# Patient Record
Sex: FEMALE | Race: WHITE | NOT HISPANIC OR LATINO | Employment: UNEMPLOYED | URBAN - METROPOLITAN AREA
[De-identification: names, ages, dates, MRNs, and addresses within clinical notes are randomized per-mention and may not be internally consistent; named-entity substitution may affect disease eponyms.]

---
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
## 2024-01-01 NOTE — TELEPHONE ENCOUNTER
Caller states she was seen in ED last night and prescribed mag citrate for constipation. Caller state she has a history of kidney disease and read on the medication insert that she should not take medication with certain kidney issues. Caller states that insert states she should speak with PCP, but she doesn't have one. Caller advised to contact local pharmacist to discuss medication safety and effects.     Reason for Disposition   [1] Caller has medicine question about med NOT prescribed by PCP AND [2] triager unable to answer question (e.g., compatibility with other med, storage)    Additional Information   Negative: [1] Intentional drug overdose AND [2] suicidal thoughts or ideas   Negative: MORE THAN A DOUBLE DOSE of a prescription or over-the-counter (OTC) drug   Negative: [1] DOUBLE DOSE (an extra dose or lesser amount) of prescription drug AND [2] any symptoms (e.g., dizziness, nausea, pain, sleepiness)   Negative: [1] DOUBLE DOSE (an extra dose or lesser amount) of over-the-counter (OTC) drug AND [2] any symptoms (e.g., dizziness, nausea, pain, sleepiness)   Negative: Took another person's prescription drug   Negative: [1] DOUBLE DOSE (an extra dose or lesser amount) of prescription drug AND [2] NO symptoms  (Exception: A double dose of antibiotics.)   Negative: Diabetes drug error or overdose (e.g., took wrong type of insulin or took extra dose)   Negative: [1] Prescription not at pharmacy AND [2] was prescribed by PCP recently (Exception: Triager has access to EMR and prescription is recorded there. Go to Home Care and confirm for pharmacy.)   Negative: [1] Pharmacy calling with prescription question AND [2] triager unable to answer question   Negative: [1] Caller has URGENT medicine question about med that PCP or specialist prescribed AND [2] triager unable to answer question   Negative: Medicine patch causing local rash or itching    Protocols used: Medication Question Call-A-

## 2024-01-08 ENCOUNTER — HOSPITAL ENCOUNTER (EMERGENCY)
Facility: HOSPITAL | Age: 19
Discharge: HOME | End: 2024-01-08
Attending: EMERGENCY MEDICINE
Payer: MEDICAID

## 2024-01-08 VITALS
TEMPERATURE: 98 F | SYSTOLIC BLOOD PRESSURE: 104 MMHG | HEIGHT: 63 IN | OXYGEN SATURATION: 97 % | HEART RATE: 104 BPM | RESPIRATION RATE: 18 BRPM | DIASTOLIC BLOOD PRESSURE: 76 MMHG | BODY MASS INDEX: 26.58 KG/M2 | WEIGHT: 150 LBS

## 2024-01-08 DIAGNOSIS — J06.9 VIRAL URI WITH COUGH: Primary | ICD-10-CM

## 2024-01-08 LAB
GROUP A STREP, MOLECULAR: NEGATIVE
INFLUENZA A, MOLECULAR: NEGATIVE
INFLUENZA B, MOLECULAR: NEGATIVE
SARS-COV-2 RDRP RESP QL NAA+PROBE: NEGATIVE
SPECIMEN SOURCE: NORMAL

## 2024-01-08 PROCEDURE — 96372 THER/PROPH/DIAG INJ SC/IM: CPT | Performed by: NURSE PRACTITIONER

## 2024-01-08 PROCEDURE — 99284 EMERGENCY DEPT VISIT MOD MDM: CPT

## 2024-01-08 PROCEDURE — 87651 STREP A DNA AMP PROBE: CPT | Performed by: NURSE PRACTITIONER

## 2024-01-08 PROCEDURE — 63600175 PHARM REV CODE 636 W HCPCS: Performed by: NURSE PRACTITIONER

## 2024-01-08 PROCEDURE — 87502 INFLUENZA DNA AMP PROBE: CPT | Performed by: NURSE PRACTITIONER

## 2024-01-08 PROCEDURE — U0002 COVID-19 LAB TEST NON-CDC: HCPCS | Performed by: NURSE PRACTITIONER

## 2024-01-08 RX ORDER — DEXAMETHASONE SODIUM PHOSPHATE 10 MG/ML
10 INJECTION INTRAMUSCULAR; INTRAVENOUS
Status: COMPLETED | OUTPATIENT
Start: 2024-01-08 | End: 2024-01-08

## 2024-01-08 RX ORDER — BROMPHENIRAMINE MALEATE, PSEUDOEPHEDRINE HYDROCHLORIDE, AND DEXTROMETHORPHAN HYDROBROMIDE 2; 30; 10 MG/5ML; MG/5ML; MG/5ML
5 SYRUP ORAL EVERY 4 HOURS PRN
Qty: 100 ML | Refills: 0 | Status: SHIPPED | OUTPATIENT
Start: 2024-01-08

## 2024-01-08 RX ADMIN — DEXAMETHASONE SODIUM PHOSPHATE 10 MG: 10 INJECTION INTRAMUSCULAR; INTRAVENOUS at 11:01

## 2024-01-08 NOTE — ED PROVIDER NOTES
CHIEF COMPLAINT  Chief Complaint   Patient presents with    Cough     Congestion, sore throat, runny nose for 4 days. Taking Nyquil, Dayquil, mucinex, flonase. No known sick contacts. +nausea. No vomiting, diarrhea.       HISTORY OF PRESENT ILLNESS  Ashley Howard is a 18 y.o. female presenting with URI symptoms for the past 4 days. Patient did not try OTC medications.  She c/o non stop runny nose, sore throat, no relief for the past 4 days. No other specific aggravating or relieving factors otherwise.      PAST MEDICAL HISTORY  Past Medical History:   Diagnosis Date    Migraine headache     PCOS (polycystic ovarian syndrome) 2022    Renal disorder     Atrophied Right kidney       CURRENT MEDICATIONS    No current facility-administered medications for this encounter.    Current Outpatient Medications:     acetaminophen (TYLENOL) 325 MG tablet, Take 2 tablets (650 mg total) by mouth every 4 (four) hours as needed (Do not use more than 4,000 mg total daily.)., Disp: , Rfl: 0    acetaminophen (TYLENOL) 500 MG tablet, Take 1 tablet (500 mg total) by mouth every 6 (six) hours as needed for Temperature greater than or Pain., Disp: 20 tablet, Rfl: 0    albuterol (PROVENTIL/VENTOLIN HFA) 90 mcg/actuation inhaler, Inhale 2 puffs into the lungs every 6 (six) hours as needed., Disp: , Rfl:     ALBUTEROL INHL, Inhale into the lungs daily as needed., Disp: , Rfl:     baclofen (LIORESAL) 10 MG tablet, Take 10 mg by mouth daily as needed., Disp: , Rfl:     benzocaine-menthoL 6-10 mg lozenge, Take 1 lozenge by mouth every 2 (two) hours as needed for Pain (sore throat)., Disp: 18 tablet, Rfl: 0    brompheniramine-pseudoeph-DM (BROMFED DM) 2-30-10 mg/5 mL Syrp, Take 5 mLs by mouth every 4 (four) hours as needed (cough, congestion)., Disp: 100 mL, Rfl: 0    busPIRone (BUSPAR) 15 MG tablet, Take 15 mg by mouth 2 (two) times daily., Disp: , Rfl:     carboxymethylcellulose (LUBRICANT EYE DROPS) 0.5 % Dpet, Place 1 drop into both  eyes 3 (three) times daily as needed (Eye irritation)., Disp: 30 each, Rfl: 0    cetirizine (ZYRTEC) 10 MG tablet, Take 1 tablet (10 mg total) by mouth daily as needed for Allergies or Rhinitis., Disp: 30 tablet, Rfl: 0    cyclobenzaprine (FLEXERIL) 5 MG tablet, Take 5 mg by mouth nightly as needed., Disp: , Rfl:     DENTA 5000 PLUS 1.1 % Crea, BRUSH ON TOOTH 1 TIME PER DAY, Disp: , Rfl: 3    diphenhydrAMINE (BENADRYL) 25 mg capsule, Take 1 capsule (25 mg total) by mouth every 6 (six) hours as needed for Itching or Allergies., Disp: 20 capsule, Rfl: 0    docusate sodium (COLACE) 100 MG capsule, Take 2 capsules (200 mg total) by mouth every evening., Disp: 60 capsule, Rfl: 0    ergocalciferol, vitamin D2, (VITAMIN D ORAL), Take by mouth once daily., Disp: , Rfl:     EScitalopram oxalate (LEXAPRO) 10 MG tablet, Take 15 mg by mouth once daily. , Disp: , Rfl:     ESTARYLLA 0.25-35 mg-mcg per tablet, Take 1 tablet by mouth., Disp: , Rfl:     fluticasone propionate (FLONASE) 50 mcg/actuation nasal spray, 1 spray (50 mcg total) by Each Nostril route 2 (two) times daily as needed for Rhinitis or Allergies., Disp: 15 g, Rfl: 0    gabapentin (NEURONTIN) 300 MG capsule, Take 300 mg by mouth 3 (three) times daily., Disp: , Rfl:     hydrocortisone 1 % cream, Apply to affected area 2 times daily, Disp: 30 g, Rfl: 1    ibuprofen (ADVIL,MOTRIN) 600 MG tablet, Take 1 tablet (600 mg total) by mouth every 6 (six) hours as needed for Pain., Disp: 20 tablet, Rfl: 0    ibuprofen (ADVIL,MOTRIN) 800 MG tablet, Take 1 tablet (800 mg total) by mouth 3 (three) times daily., Disp: 20 tablet, Rfl: 0    levocetirizine (XYZAL) 5 MG tablet, Take 5 mg by mouth once daily., Disp: , Rfl:     nitrofurantoin (MACRODANTIN) 50 MG capsule, SMARTSI Capsule(s) By Mouth, Disp: , Rfl:     norethindrone-ethinyl estradiol (MICROGESTIN ) 1-20 mg-mcg per tablet, , Disp: , Rfl:     ondansetron (ZOFRAN-ODT) 4 MG TbDL, Take 1 tablet (4 mg total) by mouth  every 6 (six) hours as needed (Nausea)., Disp: 12 tablet, Rfl: 0    pantoprazole (PROTONIX) 20 MG tablet, Take 2 tablets (40 mg total) by mouth once daily., Disp: 60 tablet, Rfl: 0    phenazopyridine (PYRIDIUM) 200 MG tablet, Take 1 tablet (200 mg total) by mouth 3 (three) times daily as needed for Pain (burning with urination)., Disp: 6 tablet, Rfl: 0    polyethylene glycol (GLYCOLAX) 17 gram PwPk, Take 17 g by mouth once daily. Please take 1 packet 3 times daily for 3 to 5 days, and then take 1 packet daily., Disp: 30 packet, Rfl: 1    promethazine (PHENERGAN) 25 MG suppository, Place 1 suppository (25 mg total) rectally every 6 (six) hours as needed for Nausea., Disp: 5 suppository, Rfl: 0    promethazine (PHENERGAN) 25 MG tablet, Take 1 tablet (25 mg total) by mouth every 6 (six) hours as needed for Nausea., Disp: 15 tablet, Rfl: 0    sumatriptan (IMITREX) 25 MG Tab, Take 25 mg by mouth., Disp: , Rfl:     venlafaxine (EFFEXOR) 100 MG Tab, Take 175 mg by mouth Daily., Disp: , Rfl:     ALLERGIES    Review of patient's allergies indicates:   Allergen Reactions    Doxycycline Nausea And Vomiting       SURGICAL HISTORY    Past Surgical History:   Procedure Laterality Date    COLONOSCOPY Left 6/7/2021    Procedure: COLONOSCOPY;  Surgeon: Tatianna Bowman MD;  Location: [address]);  Service: Endoscopy;  Laterality: Left;    DENTAL SURGERY      ESOPHAGOGASTRODUODENOSCOPY Left 6/7/2021    Procedure: ESOPHAGOGASTRODUODENOSCOPY (EGD);  Surgeon: Tatianna Bowman MD;  Location: [address]);  Service: Endoscopy;  Laterality: Left;  Covid pre-procedure screening scheduled 6/4    TYMPANOSTOMY TUBE PLACEMENT         SOCIAL HISTORY    Social History     Socioeconomic History    Marital status: Significant Other   Tobacco Use    Smoking status: Never     Passive exposure: Yes    Smokeless tobacco: Never   Substance and Sexual Activity    Alcohol use: Never    Drug use: Never    Sexual activity: Never   Social  "History Narrative    11th grade, 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    Lives at home with mom, mom's boyfriend, sister.       FAMILY HISTORY    History reviewed. No pertinent family history.    REVIEW OF SYSTEMS    Review of Systems   HENT:  Positive for congestion and sore throat.    Respiratory:  Positive for cough.      All other systems reviewed and are negative    VITAL SIGNS:   /76 (BP Location: Left arm, Patient Position: Sitting)   Pulse 104   Temp 98.1 °F (36.7 °C) (Oral)   Resp 18   Ht 5' 3" (1.6 m)   Wt 68 kg (150 lb)   SpO2 97%   BMI 26.57 kg/m²      Physical Exam  Constitutional:       Appearance: Normal appearance.   HENT:      Head: Normocephalic.      Right Ear: Tympanic membrane, ear canal and external ear normal.      Left Ear: Tympanic membrane, ear canal and external ear normal.      Nose: Congestion and rhinorrhea present.      Mouth/Throat:      Mouth: Mucous membranes are moist.      Pharynx: Posterior oropharyngeal erythema present. No oropharyngeal exudate.   Eyes:      Pupils: Pupils are equal, round, and reactive to light.   Cardiovascular:      Rate and Rhythm: Normal rate.   Pulmonary:      Effort: Pulmonary effort is normal. No respiratory distress.      Breath sounds: Normal breath sounds.   Abdominal:      Palpations: Abdomen is soft.   Musculoskeletal:         General: Normal range of motion.   Skin:     General: Skin is warm.      Capillary Refill: Capillary refill takes less than 2 seconds.   Neurological:      General: No focal deficit present.      Mental Status: She is alert.      GCS: GCS eye subscore is 4. GCS verbal subscore is 5. GCS motor subscore is 6.   Psychiatric:         Attention and Perception: Attention normal.         Mood and Affect: Mood normal.         Speech: Speech normal.       Vitals and nursing note reviewed.     LABS    Labs Reviewed   INFLUENZA A & B BY MOLECULAR   GROUP A STREP, MOLECULAR   SARS-COV-2 RNA AMPLIFICATION, QUAL    Narrative:     Is the patient " symptomatic?->Yes         EKG        RADIOLOGY    No orders to display         PROCEDURES    Procedures    Medications   dexAMETHasone sodium phos (PF) injection 10 mg (10 mg Intramuscular Given 1/8/24 3434)                Medical Decision Making  Ashley Howard is a 18 y.o. female presenting with URI symptoms for the past 4 days. Patient did not try OTC medications.  She c/o non stop runny nose, sore throat, no relief for the past 4 days. No other specific aggravating or relieving factors otherwise.    patient presenting to the ED for URI symptoms. Denies abdominal pain and emesis. Patient is non-toxic appearing and in no acute distress. Spectrum of symptoms most consistent with viral URI.  No respiratory distress or abnormal lung findings. Low suspicion for strep throat. No sinus TTP or purulent rhinorrhea to suggest acute bacterial rhinosinusitis at this time. No evidence of AOM, mastoiditis, PTA, Oskar's, epiglottitis, and meningitis.    Differential Diagnosis includes, but is not limited to:  Sepsis, meningitis, cavernous sinus thrombosis, nasal foreign body, otitis media/external, nasal polyp, bacterial sinusitis, allergic rhinitis, influenza, bacterial/viral pharyngitis, peritonsillar abscess, retropharyngeal abscess, bacterial/viral pneumonia.    Clinical impression:     At this time, I feel there is no emergent condition requiring further evaluation or admission. I believe the patient is stable for discharge from the ED. The patient and any additional family present were updated with test results, overall clinical impression, and recommended further plan of care. All questions were answered. patient/caregiver expressed understanding and agreed with current plan for discharge with PCP follow-up within 1 week. Strict return precautions were provided, including fever, N/V, headache, neck stiffness, return/worsening of current symptoms or any other concerns.    Problems Addressed:  Viral URI with cough:  acute illness or injury    Amount and/or Complexity of Data Reviewed  Labs: ordered. Decision-making details documented in ED Course.    Risk  Prescription drug management.           Discharge Medication List as of 1/8/2024 11:09 AM          Discharge Medication List as of 1/8/2024 11:09 AM        START taking these medications    Details   brompheniramine-pseudoeph-DM (BROMFED DM) 2-30-10 mg/5 mL Syrp Take 5 mLs by mouth every 4 (four) hours as needed (cough, congestion)., Starting Mon 1/8/2024, Normal             DISPOSITION  Patient discharged to home in stable condition.        FINAL IMPRESSION    1. Viral URI with cough         Thien Dickens, SANDRO  01/09/24 0003

## 2024-01-08 NOTE — ED NOTES
The patient complains of flu like symptoms for approximately 4 days with associated clear nasal drainage, clear sputum cough, nausea, and sore throat. Pt states she has taken multiple OTC symptom control medications with little relief. She states no known sick contacts and tested + for flu recently.      GENERAL: The patient weighs approximately 70kg. The patient is alert and interactive, well-nourished and non-toxic in appearance.    HEAD: Normocephalic and visually atraumatic. Pupils are 3mm round/reactive/brisk to light bilaterally with intact direct and consensual reflexes noted. No visible anisocoria is noted. Conjunctivas are pink/moist with no obvious petechiae. Oral mucosa is pink/moist. Posterior pharynx is pink with no obvious exudates. Tonsils absent. Uvula is midline. Phonation is normal    RESPIRATORY: Normal smooth respiratory effort with no obvious distress. No accessory muscle use is noted. There are no retractions noted. Bilateral breath sounds are clear    NEUROLOGIC: Alert/oriented/lucid with appropriate mental status. No slurred speech is noted. No facial droop is evident.    SKIN: Skin color is consistent with ethnicity. Warm/dry/pink.     Provider in triage assessing patient at this time. Orders being placed. Plan of Care to include continuous observation and reassurance, explain procedures to pt. Will maintain position of optimal comfort for patient. Awaiting further orders and disposition. Plan of care ongoing.

## 2024-03-17 ENCOUNTER — HOSPITAL ENCOUNTER (EMERGENCY)
Facility: HOSPITAL | Age: 19
Discharge: HOME | End: 2024-03-17
Attending: EMERGENCY MEDICINE
Payer: MEDICAID

## 2024-03-17 VITALS
DIASTOLIC BLOOD PRESSURE: 59 MMHG | TEMPERATURE: 99 F | HEART RATE: 82 BPM | SYSTOLIC BLOOD PRESSURE: 119 MMHG | RESPIRATION RATE: 18 BRPM | OXYGEN SATURATION: 100 % | HEIGHT: 63 IN | WEIGHT: 147 LBS | BODY MASS INDEX: 26.05 KG/M2

## 2024-03-17 DIAGNOSIS — S99.911A INJURY OF RIGHT ANKLE, INITIAL ENCOUNTER: ICD-10-CM

## 2024-03-17 DIAGNOSIS — S93.401A SPRAIN OF RIGHT ANKLE, UNSPECIFIED LIGAMENT, INITIAL ENCOUNTER: Primary | ICD-10-CM

## 2024-03-17 DIAGNOSIS — M25.571 RIGHT ANKLE PAIN: ICD-10-CM

## 2024-03-17 LAB
B-HCG UR QL: NEGATIVE
CTP QC/QA: YES

## 2024-03-17 PROCEDURE — 25000003 PHARM REV CODE 250: Performed by: NURSE PRACTITIONER

## 2024-03-17 PROCEDURE — 81025 URINE PREGNANCY TEST: CPT | Performed by: NURSE PRACTITIONER

## 2024-03-17 PROCEDURE — 99283 EMERGENCY DEPT VISIT LOW MDM: CPT | Mod: 25

## 2024-03-17 RX ORDER — NAPROXEN 500 MG/1
500 TABLET ORAL EVERY 12 HOURS PRN
Qty: 20 TABLET | Refills: 0 | Status: SHIPPED | OUTPATIENT
Start: 2024-03-17 | End: 2024-04-15 | Stop reason: ALTCHOICE

## 2024-03-17 RX ORDER — NAPROXEN 500 MG/1
500 TABLET ORAL
Status: COMPLETED | OUTPATIENT
Start: 2024-03-17 | End: 2024-03-17

## 2024-03-17 RX ADMIN — NAPROXEN 500 MG: 500 TABLET ORAL at 04:03

## 2024-03-17 NOTE — DISCHARGE INSTRUCTIONS

## 2024-03-17 NOTE — ED PROVIDER NOTES
Encounter Date: 3/17/2024       History     Chief Complaint   Patient presents with    Ankle Pain     Patient reports rolled right ankle the other day wants it checked out      18-year-old female with a history of fibromyalgia presenting for evaluation of right ankle pain after rolling her ankle while descending stairs 2 days ago.  Reports that pain is exacerbated with weight-bearing and ambulation.  She denies falling, head injury, knee pain, hip pain, or any other symptoms or concerns.  She has taken Tylenol for her pain but no medications or treatments attempted today.    The history is provided by the patient. No  was used.     Review of patient's allergies indicates:   Allergen Reactions    Doxycycline Nausea And Vomiting     Past Medical History:   Diagnosis Date    Migraine headache     PCOS (polycystic ovarian syndrome) 2022    Renal disorder     Atrophied Right kidney     Past Surgical History:   Procedure Laterality Date    COLONOSCOPY Left 6/7/2021    Procedure: COLONOSCOPY;  Surgeon: Tatianna Bowman MD;  Location: [address]);  Service: Endoscopy;  Laterality: Left;    DENTAL SURGERY      ESOPHAGOGASTRODUODENOSCOPY Left 6/7/2021    Procedure: ESOPHAGOGASTRODUODENOSCOPY (EGD);  Surgeon: Tatianna Bowman MD;  Location: [address]);  Service: Endoscopy;  Laterality: Left;  Covid pre-procedure screening scheduled 6/4    TYMPANOSTOMY TUBE PLACEMENT       History reviewed. No pertinent family history.  Social History     Tobacco Use    Smoking status: Never     Passive exposure: Yes    Smokeless tobacco: Never   Substance Use Topics    Alcohol use: Never    Drug use: Never     Review of Systems   Constitutional:  Negative for chills, fatigue and fever.   HENT:  Negative for congestion, ear pain, nosebleeds, postnasal drip, rhinorrhea, sinus pressure and sore throat.    Eyes:  Negative for photophobia and pain.   Respiratory:  Negative for apnea, cough, choking, chest  tightness, shortness of breath, wheezing and stridor.    Cardiovascular:  Negative for chest pain, palpitations and leg swelling.   Gastrointestinal:  Negative for abdominal pain, constipation, diarrhea, nausea and vomiting.   Genitourinary:  Negative for dysuria, flank pain, hematuria, pelvic pain and vaginal discharge.   Musculoskeletal:  Positive for arthralgias. Negative for back pain, gait problem and neck pain.   Skin:  Negative for color change, pallor, rash and wound.   Neurological:  Negative for dizziness, seizures, syncope, facial asymmetry, light-headedness and headaches.   Hematological:  Negative for adenopathy.   Psychiatric/Behavioral:  Negative for confusion. The patient is not nervous/anxious.        Physical Exam     Initial Vitals [03/17/24 1453]   BP Pulse Resp Temp SpO2   (!) 119/59 82 18 98.7 °F (37.1 °C) 100 %      MAP       --         Physical Exam    Nursing note and vitals reviewed.  Constitutional: She appears well-developed and well-nourished. She is not diaphoretic.   HENT:   Head: Normocephalic and atraumatic.   Right Ear: External ear normal.   Left Ear: External ear normal.   Nose: Nose normal.   Eyes: Conjunctivae and EOM are normal. Right eye exhibits no discharge. Left eye exhibits no discharge.   Neck: Neck supple. No tracheal deviation present.   Normal range of motion.  Cardiovascular:  Normal rate.           Pulmonary/Chest: No stridor. No respiratory distress.   Abdominal: Abdomen is soft. She exhibits no distension. There is no abdominal tenderness.   Musculoskeletal:         General: Tenderness present.      Cervical back: Normal range of motion and neck supple.      Comments: Tenderness to the right ankle overlying the lateral malleolus.  There is some associated bruising and swelling.  Ipsilateral DP pulse 2 +.  No pain or tenderness to the foot.  No tenderness overlying the Achilles.  No pain or tenderness to the ipsilateral knee.  No calf tenderness.     Neurological:  She is alert and oriented to person, place, and time. She has normal strength. No cranial nerve deficit or sensory deficit.   Skin: Skin is warm and dry.   Psychiatric: She has a normal mood and affect. Her behavior is normal. Judgment and thought content normal.         ED Course   Procedures  Labs Reviewed   POCT URINE PREGNANCY          Imaging Results              X-Ray Ankle Complete Right (Final result)  Result time 03/17/24 15:37:28      Final result by Derrick Montalvo MD (03/17/24 15:37:28)                   Impression:      1. No acute displaced fracture or dislocation of the ankle.      Electronically signed by: Derrick Montalvo MD  Date:    03/17/2024  Time:    15:37               Narrative:    EXAMINATION:  XR ANKLE COMPLETE 3 VIEW RIGHT    CLINICAL HISTORY:  Pain in right ankle and joints of right foot    TECHNIQUE:  AP, lateral, and oblique images of the right ankle were performed.    COMPARISON:  None    FINDINGS:  Three views right ankle.    No acute displaced fracture or dislocation of the ankle.  No radiopaque foreign body.  The ankle mortise is intact.                                       X-Ray Foot Complete Right (Final result)  Result time 03/17/24 15:38:30      Final result by Derrick Montalvo MD (03/17/24 15:38:30)                   Impression:      1. No acute displaced fracture or dislocation of the foot.      Electronically signed by: Derrick Montalvo MD  Date:    03/17/2024  Time:    15:38               Narrative:    EXAMINATION:  XR FOOT COMPLETE 3 VIEW RIGHT    CLINICAL HISTORY:  . Pain in right ankle and joints of right foot    TECHNIQUE:  AP, lateral, and oblique views of the right foot were performed.    COMPARISON:  None    FINDINGS:  Three views right foot.    No acute displaced fracture or dislocation of the foot.  No radiopaque foreign body.  No significant edema.                                       Medications   naproxen tablet 500 mg (500 mg Oral Given 3/17/24 6396)      Medical Decision Making  Differential diagnosis includes but not limited to fracture, dislocation, Lisfranc injury, sprain, neurovascular compromise, others      X-rays negative.  Findings consistent with right ankle sprain.  No evidence of infectious process or neurovascular compromise.  Will treat with NSAIDs.  Placed in walking boot for comfort at the patient's request.  Follow up with PCP or ortho if symptoms persist.  ED return precautions given.  Shared decision-making with the patient.    Amount and/or Complexity of Data Reviewed  Labs: ordered. Decision-making details documented in ED Course.  Radiology: ordered. Decision-making details documented in ED Course.    Risk  Prescription drug management.                                      Clinical Impression:  Final diagnoses:  [M25.571] Right ankle pain  [S93.401A] Sprain of right ankle, unspecified ligament, initial encounter (Primary)  [S99.911A] Injury of right ankle, initial encounter          ED Disposition Condition    Discharge Stable          ED Prescriptions       Medication Sig Dispense Start Date End Date Auth. Provider    naproxen (NAPROSYN) 500 MG tablet Take 1 tablet (500 mg total) by mouth every 12 (twelve) hours as needed (Pain). 20 tablet 3/17/2024 -- Ha Mckeon, NP          Follow-up Information       Follow up With Specialties Details Why Contact Info    [facility] -  Schedule an appointment as soon as possible for a visit in 1 week For further evaluation [address] [place] [address]  [phone]      [facility] Emergency Medicine Go to  If symptoms worsen, As needed [address] [facility] [place] [id number] [phone]             Ha Mckeon, NP  03/17/24 0121

## 2024-04-15 ENCOUNTER — HOSPITAL ENCOUNTER (EMERGENCY)
Facility: HOSPITAL | Age: 19
Discharge: HOME | End: 2024-04-15
Attending: EMERGENCY MEDICINE
Payer: MEDICAID

## 2024-04-15 VITALS
HEART RATE: 84 BPM | SYSTOLIC BLOOD PRESSURE: 115 MMHG | BODY MASS INDEX: 25.34 KG/M2 | TEMPERATURE: 99 F | DIASTOLIC BLOOD PRESSURE: 75 MMHG | HEIGHT: 63 IN | WEIGHT: 143 LBS | RESPIRATION RATE: 15 BRPM | OXYGEN SATURATION: 99 %

## 2024-04-15 DIAGNOSIS — L60.0 INGROWN LEFT BIG TOENAIL: Primary | ICD-10-CM

## 2024-04-15 LAB — POCT GLUCOSE: 84 MG/DL (ref 70–110)

## 2024-04-15 PROCEDURE — 25000003 PHARM REV CODE 250: Performed by: NURSE PRACTITIONER

## 2024-04-15 PROCEDURE — 99284 EMERGENCY DEPT VISIT MOD MDM: CPT

## 2024-04-15 PROCEDURE — 82962 GLUCOSE BLOOD TEST: CPT

## 2024-04-15 RX ORDER — SULFAMETHOXAZOLE AND TRIMETHOPRIM 800; 160 MG/1; MG/1
1 TABLET ORAL 2 TIMES DAILY
Qty: 14 TABLET | Refills: 0 | Status: SHIPPED | OUTPATIENT
Start: 2024-04-15 | End: 2024-04-22

## 2024-04-15 RX ORDER — IBUPROFEN 800 MG/1
800 TABLET ORAL EVERY 6 HOURS PRN
Qty: 20 TABLET | Refills: 0 | Status: SHIPPED | OUTPATIENT
Start: 2024-04-15

## 2024-04-15 RX ORDER — SULFAMETHOXAZOLE AND TRIMETHOPRIM 800; 160 MG/1; MG/1
1 TABLET ORAL
Status: COMPLETED | OUTPATIENT
Start: 2024-04-15 | End: 2024-04-15

## 2024-04-15 RX ORDER — LIDOCAINE HYDROCHLORIDE 10 MG/ML
5 INJECTION, SOLUTION EPIDURAL; INFILTRATION; INTRACAUDAL; PERINEURAL
Status: COMPLETED | OUTPATIENT
Start: 2024-04-15 | End: 2024-04-15

## 2024-04-15 RX ORDER — MUPIROCIN 20 MG/G
OINTMENT TOPICAL 3 TIMES DAILY
Qty: 30 G | Refills: 0 | Status: SHIPPED | OUTPATIENT
Start: 2024-04-15

## 2024-04-15 RX ADMIN — BACITRACIN ZINC, NEOMYCIN, POLYMYXIN B 1 EACH: 400; 3.5; 5 OINTMENT TOPICAL at 02:04

## 2024-04-15 RX ADMIN — LIDOCAINE HYDROCHLORIDE 50 MG: 10 INJECTION, SOLUTION EPIDURAL; INFILTRATION; INTRACAUDAL; PERINEURAL at 02:04

## 2024-04-15 RX ADMIN — SULFAMETHOXAZOLE AND TRIMETHOPRIM 1 TABLET: 800; 160 TABLET ORAL at 02:04

## 2024-04-15 NOTE — ED PROVIDER NOTES
"CHIEF COMPLAINT  Chief Complaint   Patient presents with    Toe Pain     Left great toe pain x a few weeks. Reports inflammation and drainage from "ingrown toenail."        HISTORY OF PRESENT ILLNESS  Ashley Howard is a 18 y.o. female presenting with ingrown toenail on left great toe. She tried OTC antibiotic ointment for several that has not helped.  No other specific aggravating or relieving factors otherwise.      PAST MEDICAL HISTORY  Past Medical History:   Diagnosis Date    Migraine headache     PCOS (polycystic ovarian syndrome) 2022    Renal disorder     Atrophied Right kidney       CURRENT MEDICATIONS    No current facility-administered medications for this encounter.    Current Outpatient Medications:     ALBUTEROL INHL, Inhale into the lungs daily as needed., Disp: , Rfl:     baclofen (LIORESAL) 10 MG tablet, Take 10 mg by mouth daily as needed., Disp: , Rfl:     benzocaine-menthoL 6-10 mg lozenge, Take 1 lozenge by mouth every 2 (two) hours as needed for Pain (sore throat)., Disp: 18 tablet, Rfl: 0    busPIRone (BUSPAR) 15 MG tablet, Take 15 mg by mouth 2 (two) times daily., Disp: , Rfl:     carboxymethylcellulose (LUBRICANT EYE DROPS) 0.5 % Dpet, Place 1 drop into both eyes 3 (three) times daily as needed (Eye irritation)., Disp: 30 each, Rfl: 0    cetirizine (ZYRTEC) 10 MG tablet, Take 1 tablet (10 mg total) by mouth daily as needed for Allergies or Rhinitis., Disp: 30 tablet, Rfl: 0    cyclobenzaprine (FLEXERIL) 5 MG tablet, Take 5 mg by mouth nightly as needed., Disp: , Rfl:     DENTA 5000 PLUS 1.1 % Crea, BRUSH ON TOOTH 1 TIME PER DAY, Disp: , Rfl: 3    diphenhydrAMINE (BENADRYL) 25 mg capsule, Take 1 capsule (25 mg total) by mouth every 6 (six) hours as needed for Itching or Allergies., Disp: 20 capsule, Rfl: 0    docusate sodium (COLACE) 100 MG capsule, Take 2 capsules (200 mg total) by mouth every evening., Disp: 60 capsule, Rfl: 0    ergocalciferol, vitamin D2, (VITAMIN D ORAL), Take by " mouth once daily., Disp: , Rfl:     EScitalopram oxalate (LEXAPRO) 10 MG tablet, Take 15 mg by mouth once daily. , Disp: , Rfl:     ESTARYLLA 0.25-35 mg-mcg per tablet, Take 1 tablet by mouth., Disp: , Rfl:     fluticasone propionate (FLONASE) 50 mcg/actuation nasal spray, 1 spray (50 mcg total) by Each Nostril route 2 (two) times daily as needed for Rhinitis or Allergies., Disp: 15 g, Rfl: 0    gabapentin (NEURONTIN) 300 MG capsule, Take 300 mg by mouth 3 (three) times daily., Disp: , Rfl:     hydrocortisone 1 % cream, Apply to affected area 2 times daily, Disp: 30 g, Rfl: 1    ibuprofen (ADVIL,MOTRIN) 800 MG tablet, Take 1 tablet (800 mg total) by mouth every 6 (six) hours as needed for Pain., Disp: 20 tablet, Rfl: 0    levocetirizine (XYZAL) 5 MG tablet, Take 5 mg by mouth once daily., Disp: , Rfl:     mupirocin (BACTROBAN) 2 % ointment, Apply topically 3 (three) times daily., Disp: 30 g, Rfl: 0    norethindrone-ethinyl estradiol (MICROGESTIN 1/20) 1-20 mg-mcg per tablet, , Disp: , Rfl:     ondansetron (ZOFRAN-ODT) 4 MG TbDL, Take 1 tablet (4 mg total) by mouth every 6 (six) hours as needed (Nausea)., Disp: 12 tablet, Rfl: 0    pantoprazole (PROTONIX) 20 MG tablet, Take 2 tablets (40 mg total) by mouth once daily., Disp: 60 tablet, Rfl: 0    polyethylene glycol (GLYCOLAX) 17 gram PwPk, Take 17 g by mouth once daily. Please take 1 packet 3 times daily for 3 to 5 days, and then take 1 packet daily., Disp: 30 packet, Rfl: 1    sulfamethoxazole-trimethoprim 800-160mg (BACTRIM DS) 800-160 mg Tab, Take 1 tablet by mouth 2 (two) times daily. for 7 days, Disp: 14 tablet, Rfl: 0    sumatriptan (IMITREX) 25 MG Tab, Take 25 mg by mouth., Disp: , Rfl:     venlafaxine (EFFEXOR) 100 MG Tab, Take 175 mg by mouth Daily., Disp: , Rfl:     ALLERGIES    Review of patient's allergies indicates:   Allergen Reactions    Doxycycline Nausea And Vomiting       SURGICAL HISTORY    Past Surgical History:   Procedure Laterality Date     COLONOSCOPY Left 6/7/2021    Procedure: COLONOSCOPY;  Surgeon: Tatianna Bowman MD;  [facility]  Service: Endoscopy;  Laterality: Left;    DENTAL SURGERY      ESOPHAGOGASTRODUODENOSCOPY Left 6/7/2021    Procedure: ESOPHAGOGASTRODUODENOSCOPY (EGD);  Surgeon: Tatianna Bowman MD;  [facility]  Service: Endoscopy;  Laterality: Left;  Covid pre-procedure screening scheduled 6/4    TYMPANOSTOMY TUBE PLACEMENT         SOCIAL HISTORY    Social History     Socioeconomic History    Marital status: Significant Other   Tobacco Use    Smoking status: Never     Passive exposure: Yes    Smokeless tobacco: Never   Substance and Sexual Activity    Alcohol use: Never    Drug use: Never    Sexual activity: Never   Social History Narrative    11th grade, 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    Lives at home with mom, mom's boyfriend, sister.     Social Determinants of Health     Financial Resource Strain: Medium Risk (2/5/2024)    Received from [facility]    Overall Financial Resource Strain (CARDIA)     Difficulty of Paying Living Expenses: Somewhat hard   Food Insecurity: No Food Insecurity (2/5/2024)    Received from [facility]    Hunger Vital Sign     Worried About Running Out of Food in the Last Year: Never true     Ran Out of Food in the Last Year: Never true   Transportation Needs: Unmet Transportation Needs (2/5/2024)    Received from [facility]    PRAPARE - Transportation     Lack of Transportation (Medical): Yes     Lack of Transportation (Non-Medical): Yes   Physical Activity: Insufficiently Active (2/5/2024)    Received from [facility]    Exercise Vital Sign     Days of Exercise per Week: 6 days     Minutes of Exercise per Session: 20 min   Stress: Stress Concern Present (2/5/2024)    Received from [facility]    [employer] [place] [employer] - Occupational Stress Questionnaire     Feeling of Stress : To some extent   Social Connections: Moderately Isolated (2/5/2024)    Received from  "[facility]    Social Connection and Isolation Panel [NHANES]     Frequency of Communication with Friends and Family: More than three times a week     Frequency of Social Gatherings with Friends and Family: Once a week     Attends Anabaptist Services: Never     Active Member of Clubs or Organizations: No     Attends Club or Organization Meetings: Never     Marital Status: Living with partner   Housing Stability: Unknown (2/5/2024)    Received from [facility]    Housing Stability Vital Sign     Unable to Pay for Housing in the Last Year: No     In the last 12 months, was there a time when you did not have a steady place to sleep or slept in a shelter (including now)?: No       FAMILY HISTORY    No family history on file.    REVIEW OF SYSTEMS    Review of Systems   Skin:         Ingrown toenail, redness     All other systems reviewed and are negative    VITAL SIGNS:   /75 (BP Location: Left arm, Patient Position: Sitting)   Pulse 84   Temp 99 °F (37.2 °C) (Oral)   Resp 15   Ht 5' 3" (1.6 m)   Wt 64.9 kg (143 lb)   LMP 04/01/2024 (Approximate)   SpO2 99%   Breastfeeding No   BMI 25.33 kg/m²      Physical Exam  Constitutional:       Appearance: Normal appearance.   Cardiovascular:      Rate and Rhythm: Normal rate.   Pulmonary:      Effort: Pulmonary effort is normal.   Skin:     General: Skin is warm.      Capillary Refill: Capillary refill takes less than 2 seconds.      Findings: Wound (left big toe, medial side, ingrown toenail, erythema, tendneress) present.   Neurological:      Mental Status: She is alert.   Psychiatric:         Mood and Affect: Mood normal.       Vitals and nursing note reviewed.     LABS    Labs Reviewed   POCT GLUCOSE   POCT GLUCOSE MONITORING CONTINUOUS         EKG          RADIOLOGY    No orders to display         PROCEDURES    Nail Removal    Date/Time: 4/15/2024 3:31 PM    Performed by: Thien Dickens NP  Authorized by: Orlando Platt MD  Consent Done: Yes  Consent: " Verbal consent obtained.  Risks and benefits: risks, benefits and alternatives were discussed  Consent given by: patient  Patient understanding: patient states understanding of the procedure being performed  Patient identity confirmed: verbally with patient  Location: left foot  Location details: left big toe  Anesthesia: local infiltration    Anesthesia:  Local Anesthetic: lidocaine 1% without epinephrine  Amount removed: partial  Nail bed sutured: no  Nail matrix removed: none  Dressing: 4x4, gauze roll, antibiotic ointment and Xeroform gauze  Patient tolerance: Patient tolerated the procedure well with no immediate complications          Medications   sulfamethoxazole-trimethoprim 800-160mg per tablet 1 tablet (1 tablet Oral Given 4/15/24 9424)   LIDOcaine (PF) 10 mg/ml (1%) injection 50 mg (50 mg Infiltration Given 4/15/24 6387)   neomycin-bacitracnZn-polymyxnB packet (1 each Topical (Top) Given 4/15/24 7908)                Medical Decision Making  Ashley Howard is a 18 y.o. female presenting with ingrown toenail on left great toe. She tried OTC antibiotic ointment for several that has not helped.  No other specific aggravating or relieving factors otherwise. Patient was ambulatory, no limited ROM, NVI on left great toe.     DDX:Cellulitis, erysipelas, skin abscess, insect bite, necrotizing fasciitis, contact dermatitis     Medial side ingrown toenail trimmed, patient tolerated well  Abx given, follow up with PCP, monitor for skin infection    Problems Addressed:  Ingrown left big toenail: acute illness or injury    Risk  OTC drugs.  Prescription drug management.  Minor surgery with identified risk factors.           Discharge Medication List as of 4/15/2024  3:34 PM        STOP taking these medications       acetaminophen (TYLENOL) 325 MG tablet Comments:   Reason for Stopping:         acetaminophen (TYLENOL) 500 MG tablet Comments:   Reason for Stopping:         albuterol (PROVENTIL/VENTOLIN HFA) 90  mcg/actuation inhaler Comments:   Reason for Stopping:         brompheniramine-pseudoeph-DM (BROMFED DM) 2-30-10 mg/5 mL Syrp Comments:   Reason for Stopping:         nitrofurantoin (MACRODANTIN) 50 MG capsule Comments:   Reason for Stopping:         phenazopyridine (PYRIDIUM) 200 MG tablet Comments:   Reason for Stopping:         promethazine (PHENERGAN) 25 MG suppository Comments:   Reason for Stopping:         promethazine (PHENERGAN) 25 MG tablet Comments:   Reason for Stopping:               Discharge Medication List as of 4/15/2024  3:34 PM        START taking these medications    Details   mupirocin (BACTROBAN) 2 % ointment Apply topically 3 (three) times daily., Starting Mon 4/15/2024, Normal      sulfamethoxazole-trimethoprim 800-160mg (BACTRIM DS) 800-160 mg Tab Take 1 tablet by mouth 2 (two) times daily. for 7 days, Starting Mon 4/15/2024, Until Mon 4/22/2024, Normal             I discussed with patient and/or family/caretaker that evaluation in the ED does not suggest any emergent or life threatening medical condition requiring immediate intervention beyond what was provided in the ED, and I believe the patient is safe for discharge.  Regardless, an unremarkable evaluation in the ED does not preclude the development or presence of a serious or life threatening condition. As such, patient was instructed to return immediately for any worsening or change in current symptoms  Patient agrees with plan of care.    DISPOSITION  Patient discharged to home in stable condition.        FINAL IMPRESSION    1. Ingrown left big toenail         Thien Dickens, SANDRO  04/15/24 6963

## 2024-09-04 ENCOUNTER — HOSPITAL ENCOUNTER (EMERGENCY)
Facility: HOSPITAL | Age: 19
Discharge: HOME | End: 2024-09-04
Attending: EMERGENCY MEDICINE
Payer: MEDICAID

## 2024-09-04 VITALS
TEMPERATURE: 99 F | WEIGHT: 135 LBS | BODY MASS INDEX: 23.92 KG/M2 | HEIGHT: 63 IN | HEART RATE: 106 BPM | OXYGEN SATURATION: 96 % | SYSTOLIC BLOOD PRESSURE: 104 MMHG | RESPIRATION RATE: 18 BRPM | DIASTOLIC BLOOD PRESSURE: 61 MMHG

## 2024-09-04 DIAGNOSIS — G43.901 MIGRAINE WITH STATUS MIGRAINOSUS, NOT INTRACTABLE, UNSPECIFIED MIGRAINE TYPE: Primary | ICD-10-CM

## 2024-09-04 LAB
ALBUMIN SERPL BCP-MCNC: 4.3 G/DL (ref 3.5–5.2)
ALP SERPL-CCNC: 89 U/L (ref 55–135)
ALT SERPL W/O P-5'-P-CCNC: 14 U/L (ref 10–44)
ANION GAP SERPL CALC-SCNC: 13 MMOL/L (ref 8–16)
AST SERPL-CCNC: 15 U/L (ref 10–40)
B-HCG UR QL: NEGATIVE
BASOPHILS # BLD AUTO: 0.03 K/UL (ref 0–0.2)
BASOPHILS NFR BLD: 0.6 % (ref 0–1.9)
BILIRUB SERPL-MCNC: 0.2 MG/DL (ref 0.1–1)
BILIRUB UR QL STRIP: NEGATIVE
BUN SERPL-MCNC: 9 MG/DL (ref 6–20)
CALCIUM SERPL-MCNC: 9.9 MG/DL (ref 8.7–10.5)
CHLORIDE SERPL-SCNC: 105 MMOL/L (ref 95–110)
CLARITY UR: ABNORMAL
CO2 SERPL-SCNC: 21 MMOL/L (ref 23–29)
COLOR UR: YELLOW
CREAT SERPL-MCNC: 1 MG/DL (ref 0.5–1.4)
CTP QC/QA: YES
DIFFERENTIAL METHOD BLD: ABNORMAL
EOSINOPHIL # BLD AUTO: 0 K/UL (ref 0–0.5)
EOSINOPHIL NFR BLD: 0.6 % (ref 0–8)
ERYTHROCYTE [DISTWIDTH] IN BLOOD BY AUTOMATED COUNT: 13 % (ref 11.5–14.5)
EST. GFR  (NO RACE VARIABLE): >60 ML/MIN/1.73 M^2
GLUCOSE SERPL-MCNC: 90 MG/DL (ref 70–110)
GLUCOSE UR QL STRIP: NEGATIVE
HCT VFR BLD AUTO: 42.7 % (ref 37–48.5)
HGB BLD-MCNC: 14.2 G/DL (ref 12–16)
HGB UR QL STRIP: NEGATIVE
IMM GRANULOCYTES # BLD AUTO: 0.02 K/UL (ref 0–0.04)
IMM GRANULOCYTES NFR BLD AUTO: 0.4 % (ref 0–0.5)
KETONES UR QL STRIP: ABNORMAL
LEUKOCYTE ESTERASE UR QL STRIP: NEGATIVE
LYMPHOCYTES # BLD AUTO: 0.5 K/UL (ref 1–4.8)
LYMPHOCYTES NFR BLD: 10.1 % (ref 18–48)
MCH RBC QN AUTO: 28.5 PG (ref 27–31)
MCHC RBC AUTO-ENTMCNC: 33.3 G/DL (ref 32–36)
MCV RBC AUTO: 86 FL (ref 82–98)
MONOCYTES # BLD AUTO: 0.6 K/UL (ref 0.3–1)
MONOCYTES NFR BLD: 11.6 % (ref 4–15)
NEUTROPHILS # BLD AUTO: 4 K/UL (ref 1.8–7.7)
NEUTROPHILS NFR BLD: 76.7 % (ref 38–73)
NITRITE UR QL STRIP: NEGATIVE
NRBC BLD-RTO: 0 /100 WBC
PH UR STRIP: 6 [PH] (ref 5–8)
PLATELET # BLD AUTO: 285 K/UL (ref 150–450)
PMV BLD AUTO: 11.1 FL (ref 9.2–12.9)
POCT GLUCOSE: 95 MG/DL (ref 70–110)
POTASSIUM SERPL-SCNC: 3.6 MMOL/L (ref 3.5–5.1)
PROT SERPL-MCNC: 7.6 G/DL (ref 6–8.4)
PROT UR QL STRIP: ABNORMAL
RBC # BLD AUTO: 4.98 M/UL (ref 4–5.4)
SODIUM SERPL-SCNC: 139 MMOL/L (ref 136–145)
SP GR UR STRIP: 1.02 (ref 1–1.03)
URN SPEC COLLECT METH UR: ABNORMAL
UROBILINOGEN UR STRIP-ACNC: NEGATIVE EU/DL
WBC # BLD AUTO: 5.25 K/UL (ref 3.9–12.7)

## 2024-09-04 PROCEDURE — 85025 COMPLETE CBC W/AUTO DIFF WBC: CPT | Performed by: PHYSICIAN ASSISTANT

## 2024-09-04 PROCEDURE — 81003 URINALYSIS AUTO W/O SCOPE: CPT | Performed by: PHYSICIAN ASSISTANT

## 2024-09-04 PROCEDURE — 25000003 PHARM REV CODE 250: Performed by: PHYSICIAN ASSISTANT

## 2024-09-04 PROCEDURE — 63600175 PHARM REV CODE 636 W HCPCS: Performed by: PHYSICIAN ASSISTANT

## 2024-09-04 PROCEDURE — 99284 EMERGENCY DEPT VISIT MOD MDM: CPT | Mod: 25

## 2024-09-04 PROCEDURE — 82962 GLUCOSE BLOOD TEST: CPT

## 2024-09-04 PROCEDURE — 96374 THER/PROPH/DIAG INJ IV PUSH: CPT

## 2024-09-04 PROCEDURE — 96375 TX/PRO/DX INJ NEW DRUG ADDON: CPT

## 2024-09-04 PROCEDURE — 81025 URINE PREGNANCY TEST: CPT | Performed by: EMERGENCY MEDICINE

## 2024-09-04 PROCEDURE — 80053 COMPREHEN METABOLIC PANEL: CPT | Performed by: PHYSICIAN ASSISTANT

## 2024-09-04 RX ORDER — DIPHENHYDRAMINE HYDROCHLORIDE 50 MG/ML
25 INJECTION INTRAMUSCULAR; INTRAVENOUS
Status: COMPLETED | OUTPATIENT
Start: 2024-09-04 | End: 2024-09-04

## 2024-09-04 RX ORDER — PROCHLORPERAZINE EDISYLATE 5 MG/ML
5 INJECTION INTRAMUSCULAR; INTRAVENOUS
Status: COMPLETED | OUTPATIENT
Start: 2024-09-04 | End: 2024-09-04

## 2024-09-04 RX ORDER — BUTALBITAL, ACETAMINOPHEN AND CAFFEINE 50; 325; 40 MG/1; MG/1; MG/1
1 TABLET ORAL EVERY 4 HOURS PRN
Qty: 15 TABLET | Refills: 0 | Status: SHIPPED | OUTPATIENT
Start: 2024-09-04 | End: 2024-09-09

## 2024-09-04 RX ORDER — BUTALBITAL, ACETAMINOPHEN AND CAFFEINE 50; 325; 40 MG/1; MG/1; MG/1
1 TABLET ORAL
Status: COMPLETED | OUTPATIENT
Start: 2024-09-04 | End: 2024-09-04

## 2024-09-04 RX ORDER — PROMETHAZINE HYDROCHLORIDE 25 MG/1
25 TABLET ORAL EVERY 6 HOURS PRN
Qty: 15 TABLET | Refills: 0 | Status: SHIPPED | OUTPATIENT
Start: 2024-09-04

## 2024-09-04 RX ORDER — ONDANSETRON 4 MG/1
4 TABLET, ORALLY DISINTEGRATING ORAL EVERY 6 HOURS PRN
Qty: 15 TABLET | Refills: 0 | Status: SHIPPED | OUTPATIENT
Start: 2024-09-04 | End: 2024-09-09

## 2024-09-04 RX ADMIN — PROCHLORPERAZINE EDISYLATE 5 MG: 5 INJECTION INTRAMUSCULAR; INTRAVENOUS at 08:09

## 2024-09-04 RX ADMIN — DIPHENHYDRAMINE HYDROCHLORIDE 25 MG: 50 INJECTION INTRAMUSCULAR; INTRAVENOUS at 08:09

## 2024-09-04 RX ADMIN — BUTALBITAL, ACETAMINOPHEN, AND CAFFEINE 1 TABLET: 325; 50; 40 TABLET ORAL at 08:09

## 2024-09-04 RX ADMIN — SODIUM CHLORIDE 1000 ML: 9 INJECTION, SOLUTION INTRAVENOUS at 08:09

## 2024-09-05 NOTE — ED NOTES
Ashley Howard, an 19 y.o. female presents to the ED reporting chronic migraines with no relief after taking usual meds of Excedrin. Endorses sensitivity to light. Denies cp/SOB. GCS 15      Chief Complaint   Patient presents with    Headache     Pt c/o migraine all day today with nausea, photophobia and phonophobia . Took Excedrin and tried relaxation techniques at home w/o relief  H/o chronic headache     Review of patient's allergies indicates:   Allergen Reactions    Doxycycline Nausea And Vomiting     Past Medical History:   Diagnosis Date    Migraine headache     PCOS (polycystic ovarian syndrome) 2022    Renal disorder     Atrophied Right kidney

## 2024-09-05 NOTE — ED PROVIDER NOTES
Encounter Date: 9/4/2024       History     Chief Complaint   Patient presents with    Headache     Pt c/o migraine all day today with nausea, photophobia and phonophobia . Took Excedrin and tried relaxation techniques at home w/o relief  H/o chronic headache     Chief complaint headache    HPI:     19-year-old female with history of migraine headache, PCOS right atrophy presenting for evaluation constant bilateral headache with associated phonophobia and photophobia that began this morning at 10:00 a.m..  Patient reports she recently had a stomach virus and had nausea and diarrhea with decreased p.o. intake.  She reports she was neurology has not years she was headache worse daily he is typically resolved drain, applying ice she was Depakote treatment without relief for this current headache.  She denies any fever, chills.  Has had nasal congestion, rhinorrhea and sore throat that she attributes to seasonal allergies.  Mother.  She denies chest pain, shortness breath, dizziness.  Reports she was having intermittent denies weakness paresthesias, confusion gait or speech difficulty. Has seen eye doctor recently. Denies fever, recent sick contacts, concern for covid, strep flu.     The history is provided by the patient.      Review of patient's allergies indicates:   Allergen Reactions    Doxycycline Nausea And Vomiting     Past Medical History:   Diagnosis Date    Migraine headache     PCOS (polycystic ovarian syndrome) 2022    Renal disorder     Atrophied Right kidney     Past Surgical History:   Procedure Laterality Date    COLONOSCOPY Left 6/7/2021    Procedure: COLONOSCOPY;  Surgeon: Tatianna Bowman MD;  Location: [address]);  Service: Endoscopy;  Laterality: Left;    DENTAL SURGERY      ESOPHAGOGASTRODUODENOSCOPY Left 6/7/2021    Procedure: ESOPHAGOGASTRODUODENOSCOPY (EGD);  Surgeon: Tatianna Bowman MD;  Location: [address]);  Service: Endoscopy;  Laterality: Left;  Covid pre-procedure  screening scheduled 6/4    TYMPANOSTOMY TUBE PLACEMENT       No family history on file.  Social History     Tobacco Use    Smoking status: Never     Passive exposure: Yes    Smokeless tobacco: Never   Substance Use Topics    Alcohol use: Never    Drug use: Never     Review of Systems   Constitutional:  Negative for chills and fever.   HENT:  Positive for congestion and rhinorrhea. Negative for ear pain, nosebleeds, sore throat and trouble swallowing.    Eyes:  Negative for redness.   Respiratory:  Negative for cough, shortness of breath and stridor.    Cardiovascular:  Negative for chest pain.   Gastrointestinal:  Positive for diarrhea and nausea. Negative for abdominal pain, constipation and vomiting.   Genitourinary:  Negative for decreased urine volume, dysuria, frequency, hematuria and urgency.   Musculoskeletal:  Negative for back pain and neck pain.   Skin:  Negative for rash and wound.   Neurological:  Positive for light-headedness and headaches. Negative for dizziness, speech difficulty, weakness and numbness.   Psychiatric/Behavioral:  Negative for confusion.        Physical Exam     Initial Vitals [09/04/24 1903]   BP Pulse Resp Temp SpO2   104/61 106 18 99.1 °F (37.3 °C) 96 %      MAP       --         Physical Exam    Nursing note and vitals reviewed.  Constitutional: She appears well-developed and well-nourished. No distress.   HENT:   Head: Normocephalic.   Right Ear: Hearing, tympanic membrane, external ear and ear canal normal.   Left Ear: Hearing, tympanic membrane, external ear and ear canal normal.   Nose: No mucosal edema or rhinorrhea. Right sinus exhibits no maxillary sinus tenderness and no frontal sinus tenderness. Left sinus exhibits no maxillary sinus tenderness and no frontal sinus tenderness.   Mouth/Throat: Uvula is midline. Mucous membranes are dry. No oropharyngeal exudate, posterior oropharyngeal edema or posterior oropharyngeal erythema.   Eyes: Conjunctivae are normal. Right eye  exhibits no discharge. Left eye exhibits no discharge. No scleral icterus.   Neck: No tracheal deviation present.   Cardiovascular:  Regular rhythm.   Tachycardia present.   Exam reveals no gallop and no friction rub.       No murmur heard.  Pulmonary/Chest: Breath sounds normal. No stridor. No respiratory distress. She has no wheezes. She has no rhonchi. She has no rales.   Musculoskeletal:         General: Normal range of motion.      Cervical back: No spinous process tenderness or muscular tenderness.     Neurological: She is alert. She has normal strength. No cranial nerve deficit or sensory deficit. Coordination and gait normal.   Skin: Skin is warm and dry. No rash noted. No erythema.   Psychiatric: She has a normal mood and affect. Her behavior is normal. Judgment and thought content normal.         ED Course   Procedures  Labs Reviewed   URINALYSIS, REFLEX TO URINE CULTURE - Abnormal       Result Value    Specimen UA Urine, Clean Catch      Color, UA Yellow      Appearance, UA Hazy (*)     pH, UA 6.0      Specific Gravity, UA 1.025      Protein, UA Trace (*)     Glucose, UA Negative      Ketones, UA 3+ (*)     Bilirubin (UA) Negative      Occult Blood UA Negative      Nitrite, UA Negative      Urobilinogen, UA Negative      Leukocytes, UA Negative      Narrative:     Specimen Source->Urine   CBC W/ AUTO DIFFERENTIAL - Abnormal    WBC 5.25      RBC 4.98      Hemoglobin 14.2      Hematocrit 42.7      MCV 86      MCH 28.5      MCHC 33.3      RDW 13.0      Platelets 285      MPV 11.1      Immature Granulocytes 0.4      Gran # (ANC) 4.0      Immature Grans (Abs) 0.02      Lymph # 0.5 (*)     Mono # 0.6      Eos # 0.0      Baso # 0.03      nRBC 0      Gran % 76.7 (*)     Lymph % 10.1 (*)     Mono % 11.6      Eosinophil % 0.6      Basophil % 0.6      Differential Method Automated     COMPREHENSIVE METABOLIC PANEL - Abnormal    Sodium 139      Potassium 3.6      Chloride 105      CO2 21 (*)     Glucose 90      BUN 9       Creatinine 1.0      Calcium 9.9      Total Protein 7.6      Albumin 4.3      Total Bilirubin 0.2      Alkaline Phosphatase 89      AST 15      ALT 14      eGFR >60      Anion Gap 13     POCT URINE PREGNANCY    POC Preg Test, Ur Negative       Acceptable Yes     POCT GLUCOSE    POCT Glucose 95            Imaging Results    None          Medications   sodium chloride 0.9% bolus 1,000 mL 1,000 mL (1,000 mLs Intravenous New Bag 9/4/24 2038)   diphenhydrAMINE injection 25 mg (25 mg Intravenous Given 9/4/24 2039)   prochlorperazine injection Soln 5 mg (5 mg Intravenous Given 9/4/24 2039)   butalbital-acetaminophen-caffeine -40 mg per tablet 1 tablet (1 tablet Oral Given 9/4/24 2039)     Medical Decision Making  presents to ED c/o headache. I believe it is likely secondary to migraine/dehydration  UPT negative. UA negative for UTI. Remarkable for ketonuria likely from dehydration from decreased PO intake and diarrhea from recent stomach virus    Differential Diagnosis included but was not limited to:  - SAH: not sudden onset or worst headache of life  - epidural/subdural hematoma: no head injury  - CVA: normal neuro exam  - temporal arteritis: no changes in vision, no temporal pain, headache not specifically unilateral  - glaucoma: age inconsistent, eye exam wnl  - meningitis: no neck stifness  - migraine: no history, no photophobia  - hypoglycemia/hyperglycemia: normal glucose  CBC with no significant anemia. CMP with no renal insufficiency or electrolyte abnormality    Pt has benign initial and repeat  neuro exam here in ED, no visual changes or ataxia. the pain was made better with 1L IVF, benadryl compazine and fioricet PO. Therefore, I do not believe there is any underlying intracranial pathology and patient is safe for discharge home with Rx for fioricet and referred to PCP.  Patient advised to return to the ED for any  new or worsening symptoms, headache unrelieved by pain medications,  blurry vision or vomiting. They verbalized their understanding back to me. They were given the opportunity to ask questions, which were reasonably addressed to the best of my ability and their apparent satisfaction.          Amount and/or Complexity of Data Reviewed  Labs: ordered.    Risk  Prescription drug management.                                      Clinical Impression:  Final diagnoses:  [G43.901] Migraine with status migrainosus, not intractable, unspecified migraine type (Primary)          ED Disposition Condition    Discharge Stable          ED Prescriptions       Medication Sig Dispense Start Date End Date Auth. Provider    butalbital-acetaminophen-caffeine -40 mg (FIORICET, ESGIC) -40 mg per tablet Take 1 tablet by mouth every 4 (four) hours as needed for Pain or Headaches. 15 tablet 9/4/2024 9/9/2024 Lillie Epps PA-C    ondansetron (ZOFRAN-ODT) 4 MG TbDL Take 1 tablet (4 mg total) by mouth every 6 (six) hours as needed. 15 tablet 9/4/2024 9/9/2024 Lillie Epps PA-C    promethazine (PHENERGAN) 25 MG tablet Take 1 tablet (25 mg total) by mouth every 6 (six) hours as needed for Nausea. 15 tablet 9/4/2024 -- Lillie Epps PA-C          Follow-up Information       Follow up With Specialties Details Why Contact Info    Your Primary Care Doctor  Schedule an appointment as soon as possible for a visit in 2 days      [facility] Emergency Medicine Go to  As needed, If symptoms worsen [address] [facility] [place] [address] [phone]             Lillie Epps PA-C  09/05/24 4221       Lillie Epps PA-C  09/05/24 6075

## 2024-09-05 NOTE — DISCHARGE INSTRUCTIONS
